# Patient Record
Sex: MALE | Race: WHITE | Employment: FULL TIME | URBAN - METROPOLITAN AREA
[De-identification: names, ages, dates, MRNs, and addresses within clinical notes are randomized per-mention and may not be internally consistent; named-entity substitution may affect disease eponyms.]

---

## 2018-09-17 ENCOUNTER — APPOINTMENT (OUTPATIENT)
Dept: CT IMAGING | Age: 63
End: 2018-09-17
Attending: EMERGENCY MEDICINE
Payer: COMMERCIAL

## 2018-09-17 ENCOUNTER — HOSPITAL ENCOUNTER (EMERGENCY)
Age: 63
Discharge: HOME | End: 2018-09-17
Attending: EMERGENCY MEDICINE
Payer: COMMERCIAL

## 2018-09-17 VITALS
WEIGHT: 155 LBS | BODY MASS INDEX: 26.46 KG/M2 | DIASTOLIC BLOOD PRESSURE: 85 MMHG | TEMPERATURE: 98.6 F | RESPIRATION RATE: 18 BRPM | HEART RATE: 105 BPM | SYSTOLIC BLOOD PRESSURE: 138 MMHG | OXYGEN SATURATION: 95 % | HEIGHT: 64 IN

## 2018-09-17 DIAGNOSIS — R56.9 SEIZURE (HCC): Primary | ICD-10-CM

## 2018-09-17 LAB
ALBUMIN SERPL-MCNC: 3.8 G/DL (ref 3.2–4.6)
ALBUMIN/GLOB SERPL: 0.9 {RATIO} (ref 1.2–3.5)
ALP SERPL-CCNC: 154 U/L (ref 50–136)
ALT SERPL-CCNC: 26 U/L (ref 12–65)
ANION GAP SERPL CALC-SCNC: 8 MMOL/L (ref 7–16)
AST SERPL-CCNC: 28 U/L (ref 15–37)
ATRIAL RATE: 111 BPM
BASOPHILS # BLD: 0 K/UL (ref 0–0.2)
BASOPHILS NFR BLD: 0 % (ref 0–2)
BILIRUB SERPL-MCNC: 0.4 MG/DL (ref 0.2–1.1)
BUN SERPL-MCNC: 19 MG/DL (ref 8–23)
CALCIUM SERPL-MCNC: 9.3 MG/DL (ref 8.3–10.4)
CALCULATED P AXIS, ECG09: 60 DEGREES
CALCULATED R AXIS, ECG10: 10 DEGREES
CALCULATED T AXIS, ECG11: 43 DEGREES
CHLORIDE SERPL-SCNC: 103 MMOL/L (ref 98–107)
CO2 SERPL-SCNC: 27 MMOL/L (ref 21–32)
CREAT SERPL-MCNC: 1.69 MG/DL (ref 0.8–1.5)
DIAGNOSIS, 93000: NORMAL
DIFFERENTIAL METHOD BLD: ABNORMAL
EOSINOPHIL # BLD: 0.2 K/UL (ref 0–0.8)
EOSINOPHIL NFR BLD: 2 % (ref 0.5–7.8)
ERYTHROCYTE [DISTWIDTH] IN BLOOD BY AUTOMATED COUNT: 12.6 %
GLOBULIN SER CALC-MCNC: 4.4 G/DL (ref 2.3–3.5)
GLUCOSE SERPL-MCNC: 106 MG/DL (ref 65–100)
HCT VFR BLD AUTO: 38.9 % (ref 41.1–50.3)
HGB BLD-MCNC: 13.2 G/DL (ref 13.6–17.2)
IMM GRANULOCYTES # BLD: 0 K/UL (ref 0–0.5)
IMM GRANULOCYTES NFR BLD AUTO: 1 % (ref 0–5)
LYMPHOCYTES # BLD: 1.5 K/UL (ref 0.5–4.6)
LYMPHOCYTES NFR BLD: 18 % (ref 13–44)
MAGNESIUM SERPL-MCNC: 2.4 MG/DL (ref 1.8–2.4)
MCH RBC QN AUTO: 30.4 PG (ref 26.1–32.9)
MCHC RBC AUTO-ENTMCNC: 33.9 G/DL (ref 31.4–35)
MCV RBC AUTO: 89.6 FL (ref 79.6–97.8)
MONOCYTES # BLD: 0.6 K/UL (ref 0.1–1.3)
MONOCYTES NFR BLD: 7 % (ref 4–12)
NEUTS SEG # BLD: 6 K/UL (ref 1.7–8.2)
NEUTS SEG NFR BLD: 73 % (ref 43–78)
NRBC # BLD: 0 K/UL (ref 0–0.2)
P-R INTERVAL, ECG05: 176 MS
PLATELET # BLD AUTO: 242 K/UL (ref 150–450)
PMV BLD AUTO: 10.6 FL (ref 9.4–12.3)
POTASSIUM SERPL-SCNC: 4.3 MMOL/L (ref 3.5–5.1)
PROT SERPL-MCNC: 8.2 G/DL (ref 6.3–8.2)
Q-T INTERVAL, ECG07: 340 MS
QRS DURATION, ECG06: 86 MS
QTC CALCULATION (BEZET), ECG08: 462 MS
RBC # BLD AUTO: 4.34 M/UL (ref 4.23–5.6)
SODIUM SERPL-SCNC: 138 MMOL/L (ref 136–145)
VENTRICULAR RATE, ECG03: 111 BPM
WBC # BLD AUTO: 8.3 K/UL (ref 4.3–11.1)

## 2018-09-17 PROCEDURE — 74011250637 HC RX REV CODE- 250/637: Performed by: EMERGENCY MEDICINE

## 2018-09-17 PROCEDURE — 99284 EMERGENCY DEPT VISIT MOD MDM: CPT | Performed by: EMERGENCY MEDICINE

## 2018-09-17 PROCEDURE — 80053 COMPREHEN METABOLIC PANEL: CPT

## 2018-09-17 PROCEDURE — 83735 ASSAY OF MAGNESIUM: CPT

## 2018-09-17 PROCEDURE — 81003 URINALYSIS AUTO W/O SCOPE: CPT | Performed by: EMERGENCY MEDICINE

## 2018-09-17 PROCEDURE — 93005 ELECTROCARDIOGRAM TRACING: CPT | Performed by: EMERGENCY MEDICINE

## 2018-09-17 PROCEDURE — 85025 COMPLETE CBC W/AUTO DIFF WBC: CPT

## 2018-09-17 PROCEDURE — 70450 CT HEAD/BRAIN W/O DYE: CPT

## 2018-09-17 RX ORDER — LEVETIRACETAM 500 MG/1
500 TABLET ORAL
Status: COMPLETED | OUTPATIENT
Start: 2018-09-17 | End: 2018-09-17

## 2018-09-17 RX ORDER — LEVETIRACETAM 500 MG/1
500 TABLET ORAL 2 TIMES DAILY
Qty: 60 TAB | Refills: 2 | Status: SHIPPED | OUTPATIENT
Start: 2018-09-17

## 2018-09-17 RX ADMIN — LEVETIRACETAM 500 MG: 500 TABLET ORAL at 15:27

## 2018-09-17 NOTE — DISCHARGE INSTRUCTIONS

## 2018-09-17 NOTE — ED PROVIDER NOTES
HPI Comments: Per nurse's notes: \"Pt arrives EMS, [place] [employer] [place], staff said he was fine but saw him stumble slightly. Pt went back to truck to eat sandwich and truck was cranked, in parked. Staff states he was shaking in front seat of vehicle. Did not see it start, but noted it lasted x2 minutes until it stopped. Pt bit tongue, very post ictal on EMS arrival. Last seizure was 35-40 years ago, has not been taking medication. HR 140s, /130. Last VS /100, HR 120s. . A&O x4 on ems arrival to ED. IV established. No meds given by EMS. Wife has been notified by EMS. Pt had patito mountain spotted fever in June. \" 
 
Patient is a 58 y.o. male presenting with seizures. The history is provided by the patient, the spouse and the EMS personnel. Seizure This is a new problem. The current episode started less than 1 hour ago. The problem has been resolved. There were 2 - 3 seizures. The most recent episode lasted 2 to 5 minutes. Pertinent negatives include no confusion, no headaches, no speech difficulty, no visual disturbance, no neck stiffness, no sore throat, no chest pain, no cough, no vomiting, no diarrhea and no muscle weakness. Characteristics include rhythmic jerking, loss of consciousness and bit tongue. Characteristics do not include eye blinking, eye deviation, bowel incontinence, bladder incontinence, apnea or cyanosis. The episode was witnessed. There was no sensation of an aura present. The seizures did not continue in the ED. The seizure(s) had no focality. Possible causes include sleep deprivation (ppatient describes frequent nocturia over the last few days keeping him from sleep). Possible causes do not include medication or dosage change, missed seizure meds, recent illness, change in alcohol use, stress, head injury or missed seizure meds. There has been no fever.   He reports no chest pain, no confusion, no visual disturbance, no diarrhea, no vomiting, no headaches, no sore throat, no muscle weakness, no stiff neck, no speech difficulty, and no cough. There were no medications administered prior to arrival. Home seizure medications include: no seizure medications. Past Medical History:  
Diagnosis Date  BPH (benign prostatic hyperplasia)  ED (erectile dysfunction)  Elevated prostate specific antigen (PSA)  GERD (gastroesophageal reflux disease)  HTN (hypertension)  Hypertrophy of prostate with urinary obstruction and other lower urinary tract symptoms (LUTS)  Impotence of organic origin  Renal cancer ([facility] [address] 2002; Left Nephrectomy; Had metastatic recurrence in the lung with resection. No chemo or radiation.  Unspecified disorder of kidney and ureter Past Surgical History:  
Procedure Laterality Date  HX NEPHRECTOMY Left 2002  HX OTHER SURGICAL Right Lung mass resection. Family History:  
Problem Relation Age of Onset  Heart Attack Paternal Grandmother [de-identified]  
 Heart Attack Maternal Grandfather [de-identified]  
 Cancer Father Throat  Colon Cancer Neg Hx  Diabetes Neg Hx Social History Social History  Marital status:  Spouse name: N/A  
 Number of children: N/A  
 Years of education: N/A Occupational History   with a [address] [place] [address]. Social History Main Topics  Smoking status: Never Smoker  Smokeless tobacco: Not on file  Alcohol use 0.5 oz/week 1 drink(s) per week Comment: minimal  
 Drug use: No  
 Sexual activity: Not on file Other Topics Concern  Not on file Social History Narrative ALLERGIES: Review of patient's allergies indicates no known allergies. Review of Systems Constitutional: Positive for fatigue. Negative for activity change, appetite change, chills and fever. HENT: Negative for sore throat. Eyes: Negative for visual disturbance. Respiratory: Negative for apnea and cough. Cardiovascular: Negative for chest pain and cyanosis. Gastrointestinal: Negative for bowel incontinence, diarrhea and vomiting. Genitourinary: Negative for bladder incontinence. Neurological: Positive for seizures and loss of consciousness. Negative for speech difficulty and headaches. Psychiatric/Behavioral: Positive for sleep disturbance. Negative for confusion, self-injury and suicidal ideas. The patient is not nervous/anxious. All other systems reviewed and are negative. Vitals:  
 09/17/18 1345 BP: 138/85 Pulse: (!) 105 Resp: 18 Temp: 98.6 °F (37 °C) SpO2: 95% Weight: 70.3 kg (155 lb) Height: 5' 4\" (1.626 m) Physical Exam  
Constitutional: He is oriented to person, place, and time. He appears well-developed and well-nourished. No distress. HENT:  
Head: Normocephalic and atraumatic. Right Ear: Tympanic membrane and external ear normal.  
Left Ear: Tympanic membrane and external ear normal.  
Mouth/Throat: Uvula is midline, oropharynx is clear and moist and mucous membranes are normal. No trismus in the jaw. Lacerations (tongue, all minor and well approximated) present. No uvula swelling. Eyes: Conjunctivae and EOM are normal. Pupils are equal, round, and reactive to light. Neck: Normal range of motion. Neck supple. No tracheal deviation present. Cardiovascular: Normal rate, regular rhythm, normal heart sounds and intact distal pulses. Exam reveals no gallop and no friction rub. No murmur heard. Pulmonary/Chest: Effort normal and breath sounds normal. No respiratory distress. He has no wheezes. Abdominal: Soft. Bowel sounds are normal. He exhibits no distension and no mass. There is no hepatosplenomegaly. There is no tenderness. There is no rebound and no guarding. Musculoskeletal: Normal range of motion. He exhibits no edema. Lymphadenopathy:  
  He has no cervical adenopathy. Neurological: He is alert and oriented to person, place, and time. He has normal strength. He displays normal reflexes. No cranial nerve deficit or sensory deficit. Coordination normal.  
Skin: Skin is warm and dry. No rash noted. He is not diaphoretic. No erythema. Psychiatric: He has a normal mood and affect. His speech is normal.  
Nursing note and vitals reviewed. MDM Number of Diagnoses or Management Options Seizure [facility]): new and requires workup Amount and/or Complexity of Data Reviewed Clinical lab tests: ordered and reviewed Tests in the radiology section of CPT®: ordered and reviewed Obtain history from someone other than the patient: yes Review and summarize past medical records: yes Independent visualization of images, tracings, or specimens: yes Risk of Complications, Morbidity, and/or Mortality Presenting problems: high Diagnostic procedures: moderate Management options: moderate Patient Progress Patient progress: stable ED Course Procedures The patient was observed in the ED. No further episodes of seizure activity while in the emergency department. Patient is followed by his own urologist with history of nephrectomy for  Renal cell carcinoma approximately 10 years ago. He will follow up as outpatient for his nocturia. Results Reviewed: 
 
 
Recent Results (from the past 24 hour(s)) EKG, 12 LEAD, INITIAL Collection Time: 09/17/18  1:45 PM  
Result Value Ref Range Ventricular Rate 111 BPM  
 Atrial Rate 111 BPM  
 P-R Interval 176 ms QRS Duration 86 ms  
 Q-T Interval 340 ms QTC Calculation (Bezet) 462 ms Calculated P Axis 60 degrees Calculated R Axis 10 degrees Calculated T Axis 43 degrees Diagnosis    
  !! AGE AND GENDER SPECIFIC ECG ANALYSIS !! Sinus tachycardia Anterior infarct , age undetermined Abnormal ECG No previous ECGs available Confirmed by MAYTE TORRES ()[place] ([id number]) on 9/17/2018 1:57:30 PM 
  
 CBC WITH AUTOMATED DIFF Collection Time: 09/17/18  1:54 PM  
Result Value Ref Range WBC 8.3 4.3 - 11.1 K/uL  
 RBC 4.34 4.23 - 5.6 M/uL  
 HGB 13.2 (L) 13.6 - 17.2 g/dL HCT 38.9 (L) 41.1 - 50.3 % MCV 89.6 79.6 - 97.8 FL  
 MCH 30.4 26.1 - 32.9 PG  
 MCHC 33.9 31.4 - 35.0 g/dL  
 RDW 12.6 % PLATELET 924 347 - 595 K/uL MPV 10.6 9.4 - 12.3 FL ABSOLUTE NRBC 0.00 0.0 - 0.2 K/uL  
 DF AUTOMATED NEUTROPHILS 73 43 - 78 % LYMPHOCYTES 18 13 - 44 % MONOCYTES 7 4.0 - 12.0 % EOSINOPHILS 2 0.5 - 7.8 % BASOPHILS 0 0.0 - 2.0 % IMMATURE GRANULOCYTES 1 0.0 - 5.0 %  
 ABS. NEUTROPHILS 6.0 1.7 - 8.2 K/UL  
 ABS. LYMPHOCYTES 1.5 0.5 - 4.6 K/UL  
 ABS. MONOCYTES 0.6 0.1 - 1.3 K/UL  
 ABS. EOSINOPHILS 0.2 0.0 - 0.8 K/UL  
 ABS. BASOPHILS 0.0 0.0 - 0.2 K/UL  
 ABS. IMM. GRANS. 0.0 0.0 - 0.5 K/UL METABOLIC PANEL, COMPREHENSIVE Collection Time: 09/17/18  1:54 PM  
Result Value Ref Range Sodium 138 136 - 145 mmol/L Potassium 4.3 3.5 - 5.1 mmol/L Chloride 103 98 - 107 mmol/L  
 CO2 27 21 - 32 mmol/L Anion gap 8 7 - 16 mmol/L Glucose 106 (H) 65 - 100 mg/dL BUN 19 8 - 23 MG/DL Creatinine 1.69 (H) 0.8 - 1.5 MG/DL  
 GFR est AA 53 (L) >60 ml/min/1.73m2 GFR est non-AA 44 (L) >60 ml/min/1.73m2 Calcium 9.3 8.3 - 10.4 MG/DL Bilirubin, total 0.4 0.2 - 1.1 MG/DL  
 ALT (SGPT) 26 12 - 65 U/L  
 AST (SGOT) 28 15 - 37 U/L Alk. phosphatase 154 (H) 50 - 136 U/L Protein, total 8.2 6.3 - 8.2 g/dL Albumin 3.8 3.2 - 4.6 g/dL Globulin 4.4 (H) 2.3 - 3.5 g/dL A-G Ratio 0.9 (L) 1.2 - 3.5 MAGNESIUM Collection Time: 09/17/18  1:54 PM  
Result Value Ref Range Magnesium 2.4 1.8 - 2.4 mg/dL CT HEAD WO CONT Final Result Impression: Findings suggestive of small vessel disease. If hyperacute injury is  
suspected MRI should be considered. Urinalysis negative for evidence of infection.  
 
I discussed the results of all labs, procedures, radiographs, and treatments with the patient and available family. Treatment plan is agreed upon and the patient is ready for discharge. All voiced understanding of the discharge plan and medication instructions or changes as appropriate. Questions about treatment in the ED were answered. All were encouraged to return should symptoms worsen or new problems develop.

## 2018-09-17 NOTE — ED NOTES
I have reviewed discharge instructions with the patient and spouse. The patient and spouse verbalized understanding. Patient left ED via Discharge Method: ambulatory to Home with wife. Opportunity for questions and clarification provided. Patient given 1 scripts. No e-sign. To continue your aftercare when you leave the hospital, you may receive an automated call from our care team to check in on how you are doing. This is a free service and part of our promise to provide the best care and service to meet your aftercare needs.  If you have questions, or wish to unsubscribe from this service please call [phone]. Thank you for Choosing our [facility] Emergency Department.

## 2018-09-17 NOTE — ED TRIAGE NOTES
Pt arrives EMS, [place] [employer] [place], staff said he was fine but saw him stumble slightly. Pt went back to truck to eat sandwich and truck was cranked, in parked. Staff states he was shaking in front seat of vehicle. Did not see it start, but noted it lasted x2 minutes until it stopped. Pt bit tongue, very post ictal on EMS arrival. Last seizure was 35-40 years ago, has not been taking medication. HR 140s, /130. Last VS /100, HR 120s. . A&O x4 on ems arrival to ED. IV established. No meds given by EMS. Wife has been notified by EMS. Pt had patito mountain spotted fever in June.